# Patient Record
Sex: FEMALE | Race: WHITE | NOT HISPANIC OR LATINO | URBAN - METROPOLITAN AREA
[De-identification: names, ages, dates, MRNs, and addresses within clinical notes are randomized per-mention and may not be internally consistent; named-entity substitution may affect disease eponyms.]

---

## 2020-07-25 ENCOUNTER — TELEPHONE ENCOUNTER (OUTPATIENT)
Dept: URBAN - METROPOLITAN AREA CLINIC 13 | Facility: CLINIC | Age: 55
End: 2020-07-25

## 2020-07-25 RX ORDER — POLYETHYLENE GLYCOL 3350, SODIUM CHLORIDE, SODIUM BICARBONATE AND POTASSIUM CHLORIDE WITH LEMON FLAVOR 420; 11.2; 5.72; 1.48 G/4L; G/4L; G/4L; G/4L
TAKE 1/2 GALLON AT 5:00 PM DAY BEFORE PROCEDURE, TAKE SECOND 1/2 OF GALLON 6 HRS PRIOR TO PROCEDURE POWDER, FOR SOLUTION ORAL
Qty: 1 | Refills: 0 | OUTPATIENT
Start: 2018-04-06 | End: 2018-06-19

## 2020-07-26 ENCOUNTER — TELEPHONE ENCOUNTER (OUTPATIENT)
Dept: URBAN - METROPOLITAN AREA CLINIC 13 | Facility: CLINIC | Age: 55
End: 2020-07-26

## 2020-07-26 RX ORDER — TAPENTADOL HYDROCHLORIDE 150 MG/1
TAKE 1 TABLET TWICE DAILY TABLET, FILM COATED, EXTENDED RELEASE ORAL
Refills: 0 | Status: ACTIVE | COMMUNITY

## 2020-07-26 RX ORDER — DULOXETINE 60 MG/1
TAKE 1 CAPSULE TWICE DAILY CAPSULE, DELAYED RELEASE PELLETS ORAL
Refills: 0 | Status: ACTIVE | COMMUNITY
Start: 2018-04-06

## 2020-07-26 RX ORDER — BREXPIPRAZOLE 1 MG/1
TAKE 1 TABLET DAILY TABLET ORAL
Refills: 0 | Status: ACTIVE | COMMUNITY

## 2020-07-26 RX ORDER — BISACODYL 5 MG/1
TAKE 1 TABLET DAILY TABLET, COATED ORAL
Refills: 0 | Status: ACTIVE | COMMUNITY

## 2020-07-26 RX ORDER — TRAZODONE HYDROCHLORIDE 50 MG/1
TAKE 2 TABLET BEDTIME TABLET ORAL
Refills: 0 | Status: ACTIVE | COMMUNITY
Start: 2018-04-06

## 2020-07-26 RX ORDER — OXYCODONE AND ACETAMINOPHEN 10; 325 MG/1; MG/1
TAKE 1 TABLET 4 TIMES DAILY TABLET ORAL
Refills: 0 | Status: ACTIVE | COMMUNITY
Start: 2018-04-06

## 2020-07-26 RX ORDER — LISINOPRIL AND HYDROCHLOROTHIAZIDE TABLETS 20; 25 MG/1; MG/1
TAKE 1 TABLET ONCE DAILY TABLET ORAL
Refills: 0 | Status: ACTIVE | COMMUNITY
Start: 2018-04-06

## 2020-07-26 RX ORDER — IBUPROFEN AND FAMOTIDINE 800; 26.6 MG/1; MG/1
TAKE 1 TABLET DAILY PRN TABLET, COATED ORAL
Refills: 0 | Status: ACTIVE | COMMUNITY
Start: 2018-04-06

## 2020-07-26 RX ORDER — OMEPRAZOLE 40 MG/1
TAKE 1 TABLET DAILY CAPSULE, DELAYED RELEASE ORAL
Refills: 11 | Status: ACTIVE | COMMUNITY
Start: 2018-04-06

## 2023-09-26 ENCOUNTER — OFFICE VISIT (OUTPATIENT)
Dept: URBAN - METROPOLITAN AREA CLINIC 107 | Facility: CLINIC | Age: 58
End: 2023-09-26
Payer: MEDICARE

## 2023-09-26 ENCOUNTER — LAB OUTSIDE AN ENCOUNTER (OUTPATIENT)
Dept: URBAN - METROPOLITAN AREA CLINIC 107 | Facility: CLINIC | Age: 58
End: 2023-09-26

## 2023-09-26 VITALS
RESPIRATION RATE: 18 BRPM | SYSTOLIC BLOOD PRESSURE: 114 MMHG | WEIGHT: 152 LBS | DIASTOLIC BLOOD PRESSURE: 82 MMHG | HEIGHT: 64 IN | TEMPERATURE: 97.3 F | HEART RATE: 90 BPM | BODY MASS INDEX: 25.95 KG/M2

## 2023-09-26 DIAGNOSIS — R14.0 ABDOMINAL BLOATING: ICD-10-CM

## 2023-09-26 DIAGNOSIS — R19.7 DIARRHEA, UNSPECIFIED TYPE: ICD-10-CM

## 2023-09-26 DIAGNOSIS — R68.81 EARLY SATIETY: ICD-10-CM

## 2023-09-26 DIAGNOSIS — K21.9 GASTROESOPHAGEAL REFLUX DISEASE WITHOUT ESOPHAGITIS: ICD-10-CM

## 2023-09-26 DIAGNOSIS — K59.09 CHRONIC CONSTIPATION: ICD-10-CM

## 2023-09-26 DIAGNOSIS — K57.30 COLON, DIVERTICULOSIS: ICD-10-CM

## 2023-09-26 PROBLEM — 266435005: Status: ACTIVE | Noted: 2023-09-26

## 2023-09-26 PROBLEM — 733657002: Status: ACTIVE | Noted: 2023-09-26

## 2023-09-26 PROCEDURE — 99204 OFFICE O/P NEW MOD 45 MIN: CPT | Performed by: NURSE PRACTITIONER

## 2023-09-26 RX ORDER — OXYCODONE AND ACETAMINOPHEN 10; 325 MG/1; MG/1
TAKE 1 TABLET 4 TIMES DAILY TABLET ORAL
Refills: 0 | Status: ON HOLD | COMMUNITY
Start: 2018-04-06

## 2023-09-26 RX ORDER — BREXPIPRAZOLE 1 MG/1
TAKE 1 TABLET DAILY TABLET ORAL
Refills: 0 | Status: ON HOLD | COMMUNITY

## 2023-09-26 RX ORDER — VENLAFAXINE HYDROCHLORIDE 75 MG/1
1 CAPSULE WITH FOOD CAPSULE, EXTENDED RELEASE ORAL ONCE A DAY
Status: ACTIVE | COMMUNITY

## 2023-09-26 RX ORDER — DULOXETINE 60 MG/1
TAKE 1 CAPSULE TWICE DAILY CAPSULE, DELAYED RELEASE PELLETS ORAL
Refills: 0 | Status: ON HOLD | COMMUNITY
Start: 2018-04-06

## 2023-09-26 RX ORDER — TAPENTADOL HYDROCHLORIDE 150 MG/1
TAKE 1 TABLET TWICE DAILY TABLET, FILM COATED, EXTENDED RELEASE ORAL
Refills: 0 | Status: ON HOLD | COMMUNITY

## 2023-09-26 RX ORDER — IBUPROFEN AND FAMOTIDINE 800; 26.6 MG/1; MG/1
TAKE 1 TABLET DAILY PRN TABLET, COATED ORAL
Refills: 0 | Status: ON HOLD | COMMUNITY
Start: 2018-04-06

## 2023-09-26 RX ORDER — GABAPENTIN 300 MG/1
1 CAPSULE CAPSULE ORAL ONCE A DAY
Status: ACTIVE | COMMUNITY

## 2023-09-26 RX ORDER — LETROZOLE 2.5 MG/1
1 TABLET TABLET, FILM COATED ORAL ONCE A DAY
Status: ACTIVE | COMMUNITY

## 2023-09-26 RX ORDER — BENAZEPRIL HYDROCHLORIDE AND HYDROCHLOROTHIAZIDE 10; 12.5 MG/1; MG/1
1 TABLET TABLET, FILM COATED ORAL ONCE A DAY
Status: ACTIVE | COMMUNITY

## 2023-09-26 RX ORDER — TRAZODONE HYDROCHLORIDE 50 MG/1
TAKE 2 TABLET BEDTIME TABLET ORAL
Refills: 0 | Status: ON HOLD | COMMUNITY
Start: 2018-04-06

## 2023-09-26 RX ORDER — OXYCODONE HYDROCHLORIDE AND ACETAMINOPHEN 10; 325 MG/1; MG/1
1 TABLET AS NEEDED TABLET ORAL
Status: ACTIVE | COMMUNITY

## 2023-09-26 RX ORDER — DEXTROAMPHETAMINE SACCHARATE, AMPHETAMINE ASPARTATE, DEXTROAMPHETAMINE SULFATE, AND AMPHETAMINE SULFATE 7.5; 7.5; 7.5; 7.5 MG/1; MG/1; MG/1; MG/1
1 TABLET TABLET ORAL TWICE A DAY
Status: ACTIVE | COMMUNITY

## 2023-09-26 RX ORDER — OMEPRAZOLE 40 MG/1
TAKE 1 TABLET DAILY CAPSULE, DELAYED RELEASE ORAL
Refills: 11 | Status: ACTIVE | COMMUNITY
Start: 2018-04-06

## 2023-09-26 RX ORDER — LISINOPRIL AND HYDROCHLOROTHIAZIDE TABLETS 20; 25 MG/1; MG/1
TAKE 1 TABLET ONCE DAILY TABLET ORAL
Refills: 0 | Status: ON HOLD | COMMUNITY
Start: 2018-04-06

## 2023-09-26 RX ORDER — BISACODYL 5 MG/1
TAKE 1 TABLET DAILY TABLET, COATED ORAL
Refills: 0 | Status: ON HOLD | COMMUNITY

## 2023-09-26 NOTE — HPI-TODAY'S VISIT:
She reports onset of liquid diarrhea 4 months ago.  She was having as many as 8 bowel movements per day with occasional nocturnal stools.  She reports 3 episodes of nocturnal incontinence.  She was taking Imodium.  After second dose, diarrhea would resolve for a few days and then recur.  In the last week, she has not required Imodium.  She is having a loose bowel movement followed by a formed stool every day.  On one occasion, she saw something in her stool that may have been a clot or mucus or normal.  She took a photo.  Of note, it appears there is mucus on soft, formed, brown stool on the photo.  She denies abdominal pain or cramping associated with diarrhea.  She reported periods of time during which she felt dehydrated.  She drank electrolyte replacement beverages which were helpful.  She denies associated fever.  She denies antecedent antibiotic use.  She drinks milk twice a week.  Ice cream and a milkshake is infrequent.  She eats hard cheeses. At baseline, she has constipation.  She has not had a bowel movement in 2 or 3 days, she takes Dulcolax.  She requires this once or twice a month. She had a hysterectomy last year.  Since then, she has experienced bloating over the level of the umbilicus toward the epigastrium.  She reports that it "feels like it may burst."  She reports passing flatus and belching.  She reports worsening abdominal noise.  She has a history of acid reflux.  She has been on omeprazole 40 mg daily for years.  She has occasional heartburn associated with bloating for which she takes Tums.  In the last 4 months, she is also experienced fullness early in meals.  She describes early satiety.  She has infrequent dysphagia describing oropharyngeal dysphagia stating that she occasionally has to forcefully swallow to move material through her throat.  Patient 
This is a 58-year-old female with a history of hyperlipidemia, hypertension, anxiety, GERD presenting for evaluation of diarrhea. She was last seen 6/19/2018 for a screening colonoscopy notable for BBPS 8, sigmoid diverticulosis, normal terminal ileum, no specimens collected.  Colonoscopy for screening recommended 2028.
12-Sep-2022 16:35

## 2023-09-28 ENCOUNTER — TELEPHONE ENCOUNTER (OUTPATIENT)
Dept: URBAN - METROPOLITAN AREA CLINIC 113 | Facility: CLINIC | Age: 58
End: 2023-09-28

## 2023-09-28 ENCOUNTER — OFFICE VISIT (OUTPATIENT)
Dept: URBAN - METROPOLITAN AREA SURGERY CENTER 25 | Facility: SURGERY CENTER | Age: 58
End: 2023-09-28
Payer: MEDICARE

## 2023-09-28 ENCOUNTER — WEB ENCOUNTER (OUTPATIENT)
Dept: URBAN - METROPOLITAN AREA SURGERY CENTER 25 | Facility: SURGERY CENTER | Age: 58
End: 2023-09-28

## 2023-09-28 ENCOUNTER — LAB OUTSIDE AN ENCOUNTER (OUTPATIENT)
Dept: URBAN - METROPOLITAN AREA CLINIC 113 | Facility: CLINIC | Age: 58
End: 2023-09-28

## 2023-09-28 ENCOUNTER — CLAIMS CREATED FROM THE CLAIM WINDOW (OUTPATIENT)
Dept: URBAN - METROPOLITAN AREA CLINIC 4 | Facility: CLINIC | Age: 58
End: 2023-09-28
Payer: MEDICARE

## 2023-09-28 DIAGNOSIS — K29.50 UNSPECIFIED CHRONIC GASTRITIS WITHOUT BLEEDING: ICD-10-CM

## 2023-09-28 DIAGNOSIS — K21.9 GASTRO-ESOPHAGEAL REFLUX DISEASE WITHOUT ESOPHAGITIS: ICD-10-CM

## 2023-09-28 DIAGNOSIS — K21.9 GASTROESOPHAGEAL REFLUX DISEASE WITHOUT ESOPHAGITIS: ICD-10-CM

## 2023-09-28 DIAGNOSIS — K31.7 POLYP OF STOMACH AND DUODENUM: ICD-10-CM

## 2023-09-28 DIAGNOSIS — K29.70 GASTRITIS, UNSPECIFIED, WITHOUT BLEEDING: ICD-10-CM

## 2023-09-28 DIAGNOSIS — R68.81 EARLY SATIETY: ICD-10-CM

## 2023-09-28 DIAGNOSIS — K31.7 BENIGN GASTRIC POLYP: ICD-10-CM

## 2023-09-28 DIAGNOSIS — K31.89 REACTIVE GASTROPATHY: ICD-10-CM

## 2023-09-28 PROCEDURE — 00731 ANES UPR GI NDSC PX NOS: CPT | Performed by: ANESTHESIOLOGY

## 2023-09-28 PROCEDURE — G8907 PT DOC NO EVENTS ON DISCHARG: HCPCS | Performed by: INTERNAL MEDICINE

## 2023-09-28 PROCEDURE — 88305 TISSUE EXAM BY PATHOLOGIST: CPT | Performed by: PATHOLOGY

## 2023-09-28 PROCEDURE — 00731 ANES UPR GI NDSC PX NOS: CPT | Performed by: NURSE ANESTHETIST, CERTIFIED REGISTERED

## 2023-09-28 PROCEDURE — 43239 EGD BIOPSY SINGLE/MULTIPLE: CPT | Performed by: INTERNAL MEDICINE

## 2023-09-28 PROCEDURE — 88312 SPECIAL STAINS GROUP 1: CPT | Performed by: PATHOLOGY

## 2023-09-28 RX ORDER — LISINOPRIL AND HYDROCHLOROTHIAZIDE TABLETS 20; 25 MG/1; MG/1
TAKE 1 TABLET ONCE DAILY TABLET ORAL
Refills: 0 | Status: ON HOLD | COMMUNITY
Start: 2018-04-06

## 2023-09-28 RX ORDER — VENLAFAXINE HYDROCHLORIDE 75 MG/1
1 CAPSULE WITH FOOD CAPSULE, EXTENDED RELEASE ORAL ONCE A DAY
Status: ACTIVE | COMMUNITY

## 2023-09-28 RX ORDER — OXYCODONE HYDROCHLORIDE AND ACETAMINOPHEN 10; 325 MG/1; MG/1
1 TABLET AS NEEDED TABLET ORAL
Status: ACTIVE | COMMUNITY

## 2023-09-28 RX ORDER — DEXTROAMPHETAMINE SACCHARATE, AMPHETAMINE ASPARTATE, DEXTROAMPHETAMINE SULFATE, AND AMPHETAMINE SULFATE 7.5; 7.5; 7.5; 7.5 MG/1; MG/1; MG/1; MG/1
1 TABLET TABLET ORAL TWICE A DAY
Status: ACTIVE | COMMUNITY

## 2023-09-28 RX ORDER — OXYCODONE AND ACETAMINOPHEN 10; 325 MG/1; MG/1
TAKE 1 TABLET 4 TIMES DAILY TABLET ORAL
Refills: 0 | Status: ON HOLD | COMMUNITY
Start: 2018-04-06

## 2023-09-28 RX ORDER — BREXPIPRAZOLE 1 MG/1
TAKE 1 TABLET DAILY TABLET ORAL
Refills: 0 | Status: ON HOLD | COMMUNITY

## 2023-09-28 RX ORDER — BISACODYL 5 MG/1
TAKE 1 TABLET DAILY TABLET, COATED ORAL
Refills: 0 | Status: ON HOLD | COMMUNITY

## 2023-09-28 RX ORDER — GABAPENTIN 300 MG/1
1 CAPSULE CAPSULE ORAL ONCE A DAY
Status: ACTIVE | COMMUNITY

## 2023-09-28 RX ORDER — TRAZODONE HYDROCHLORIDE 50 MG/1
TAKE 2 TABLET BEDTIME TABLET ORAL
Refills: 0 | Status: ON HOLD | COMMUNITY
Start: 2018-04-06

## 2023-09-28 RX ORDER — DULOXETINE 60 MG/1
TAKE 1 CAPSULE TWICE DAILY CAPSULE, DELAYED RELEASE PELLETS ORAL
Refills: 0 | Status: ON HOLD | COMMUNITY
Start: 2018-04-06

## 2023-09-28 RX ORDER — BENAZEPRIL HYDROCHLORIDE AND HYDROCHLOROTHIAZIDE 10; 12.5 MG/1; MG/1
1 TABLET TABLET, FILM COATED ORAL ONCE A DAY
Status: ACTIVE | COMMUNITY

## 2023-09-28 RX ORDER — OMEPRAZOLE 40 MG/1
TAKE 1 TABLET DAILY CAPSULE, DELAYED RELEASE ORAL
Refills: 11 | Status: ACTIVE | COMMUNITY
Start: 2018-04-06

## 2023-09-28 RX ORDER — LETROZOLE 2.5 MG/1
1 TABLET TABLET, FILM COATED ORAL ONCE A DAY
Status: ACTIVE | COMMUNITY

## 2023-09-28 RX ORDER — IBUPROFEN AND FAMOTIDINE 800; 26.6 MG/1; MG/1
TAKE 1 TABLET DAILY PRN TABLET, COATED ORAL
Refills: 0 | Status: ON HOLD | COMMUNITY
Start: 2018-04-06

## 2023-09-28 RX ORDER — TAPENTADOL HYDROCHLORIDE 150 MG/1
TAKE 1 TABLET TWICE DAILY TABLET, FILM COATED, EXTENDED RELEASE ORAL
Refills: 0 | Status: ON HOLD | COMMUNITY

## 2023-12-04 ENCOUNTER — OUT OF OFFICE VISIT (OUTPATIENT)
Dept: URBAN - METROPOLITAN AREA SURGERY CENTER 25 | Facility: SURGERY CENTER | Age: 58
End: 2023-12-04
Payer: MEDICARE

## 2023-12-04 ENCOUNTER — CLAIMS CREATED FROM THE CLAIM WINDOW (OUTPATIENT)
Dept: URBAN - METROPOLITAN AREA CLINIC 4 | Facility: CLINIC | Age: 58
End: 2023-12-04
Payer: MEDICARE

## 2023-12-04 DIAGNOSIS — K63.89 OTHER SPECIFIED DISEASES OF INTESTINE: ICD-10-CM

## 2023-12-04 DIAGNOSIS — K62.1 ANAL AND RECTAL POLYP: ICD-10-CM

## 2023-12-04 DIAGNOSIS — K64.0 FIRST DEGREE HEMORRHOIDS: ICD-10-CM

## 2023-12-04 DIAGNOSIS — R19.4 CHANGE IN BOWEL HABITS: ICD-10-CM

## 2023-12-04 DIAGNOSIS — R19.7 DIARRHEA, UNSPECIFIED TYPE: ICD-10-CM

## 2023-12-04 DIAGNOSIS — D12.8 ADENOMATOUS POLYP OF RECTUM: ICD-10-CM

## 2023-12-04 DIAGNOSIS — R19.7 DIARRHEA: ICD-10-CM

## 2023-12-04 DIAGNOSIS — K57.30 COLON, DIVERTICULOSIS: ICD-10-CM

## 2023-12-04 PROCEDURE — 00811 ANES LWR INTST NDSC NOS: CPT | Performed by: ANESTHESIOLOGIST ASSISTANT

## 2023-12-04 PROCEDURE — 00811 ANES LWR INTST NDSC NOS: CPT | Performed by: ANESTHESIOLOGY

## 2023-12-04 PROCEDURE — 45380 COLONOSCOPY AND BIOPSY: CPT | Performed by: INTERNAL MEDICINE

## 2023-12-04 PROCEDURE — 88313 SPECIAL STAINS GROUP 2: CPT | Performed by: PATHOLOGY

## 2023-12-04 PROCEDURE — G8907 PT DOC NO EVENTS ON DISCHARG: HCPCS | Performed by: INTERNAL MEDICINE

## 2023-12-04 PROCEDURE — 88305 TISSUE EXAM BY PATHOLOGIST: CPT | Performed by: PATHOLOGY

## 2023-12-04 PROCEDURE — 88342 IMHCHEM/IMCYTCHM 1ST ANTB: CPT | Performed by: PATHOLOGY

## 2023-12-04 RX ORDER — BENAZEPRIL HYDROCHLORIDE AND HYDROCHLOROTHIAZIDE 10; 12.5 MG/1; MG/1
1 TABLET TABLET, FILM COATED ORAL ONCE A DAY
Status: ACTIVE | COMMUNITY

## 2023-12-04 RX ORDER — OXYCODONE HYDROCHLORIDE AND ACETAMINOPHEN 10; 325 MG/1; MG/1
1 TABLET AS NEEDED TABLET ORAL
Status: ACTIVE | COMMUNITY

## 2023-12-04 RX ORDER — LISINOPRIL AND HYDROCHLOROTHIAZIDE TABLETS 20; 25 MG/1; MG/1
TAKE 1 TABLET ONCE DAILY TABLET ORAL
Refills: 0 | Status: ON HOLD | COMMUNITY
Start: 2018-04-06

## 2023-12-04 RX ORDER — OXYCODONE AND ACETAMINOPHEN 10; 325 MG/1; MG/1
TAKE 1 TABLET 4 TIMES DAILY TABLET ORAL
Refills: 0 | Status: ON HOLD | COMMUNITY
Start: 2018-04-06

## 2023-12-04 RX ORDER — DULOXETINE 60 MG/1
TAKE 1 CAPSULE TWICE DAILY CAPSULE, DELAYED RELEASE PELLETS ORAL
Refills: 0 | Status: ON HOLD | COMMUNITY
Start: 2018-04-06

## 2023-12-04 RX ORDER — TAPENTADOL HYDROCHLORIDE 150 MG/1
TAKE 1 TABLET TWICE DAILY TABLET, FILM COATED, EXTENDED RELEASE ORAL
Refills: 0 | Status: ON HOLD | COMMUNITY

## 2023-12-04 RX ORDER — IBUPROFEN AND FAMOTIDINE 800; 26.6 MG/1; MG/1
TAKE 1 TABLET DAILY PRN TABLET, COATED ORAL
Refills: 0 | Status: ON HOLD | COMMUNITY
Start: 2018-04-06

## 2023-12-04 RX ORDER — BREXPIPRAZOLE 1 MG/1
TAKE 1 TABLET DAILY TABLET ORAL
Refills: 0 | Status: ON HOLD | COMMUNITY

## 2023-12-04 RX ORDER — BISACODYL 5 MG/1
TAKE 1 TABLET DAILY TABLET, COATED ORAL
Refills: 0 | Status: ON HOLD | COMMUNITY

## 2023-12-04 RX ORDER — GABAPENTIN 300 MG/1
1 CAPSULE CAPSULE ORAL ONCE A DAY
Status: ACTIVE | COMMUNITY

## 2023-12-04 RX ORDER — DEXTROAMPHETAMINE SACCHARATE, AMPHETAMINE ASPARTATE, DEXTROAMPHETAMINE SULFATE, AND AMPHETAMINE SULFATE 7.5; 7.5; 7.5; 7.5 MG/1; MG/1; MG/1; MG/1
1 TABLET TABLET ORAL TWICE A DAY
Status: ACTIVE | COMMUNITY

## 2023-12-04 RX ORDER — LETROZOLE 2.5 MG/1
1 TABLET TABLET, FILM COATED ORAL ONCE A DAY
Status: ACTIVE | COMMUNITY

## 2023-12-04 RX ORDER — OMEPRAZOLE 40 MG/1
TAKE 1 TABLET DAILY CAPSULE, DELAYED RELEASE ORAL
Refills: 11 | Status: ACTIVE | COMMUNITY
Start: 2018-04-06

## 2023-12-04 RX ORDER — VENLAFAXINE HYDROCHLORIDE 75 MG/1
1 CAPSULE WITH FOOD CAPSULE, EXTENDED RELEASE ORAL ONCE A DAY
Status: ACTIVE | COMMUNITY

## 2023-12-04 RX ORDER — TRAZODONE HYDROCHLORIDE 50 MG/1
TAKE 2 TABLET BEDTIME TABLET ORAL
Refills: 0 | Status: ON HOLD | COMMUNITY
Start: 2018-04-06

## 2023-12-27 ENCOUNTER — DASHBOARD ENCOUNTERS (OUTPATIENT)
Age: 58
End: 2023-12-27

## 2023-12-27 ENCOUNTER — OFFICE VISIT (OUTPATIENT)
Dept: URBAN - METROPOLITAN AREA CLINIC 107 | Facility: CLINIC | Age: 58
End: 2023-12-27
Payer: MEDICARE

## 2023-12-27 VITALS
HEART RATE: 113 BPM | SYSTOLIC BLOOD PRESSURE: 121 MMHG | WEIGHT: 153 LBS | TEMPERATURE: 97.6 F | DIASTOLIC BLOOD PRESSURE: 93 MMHG | HEIGHT: 64 IN | BODY MASS INDEX: 26.12 KG/M2

## 2023-12-27 DIAGNOSIS — R68.81 EARLY SATIETY: ICD-10-CM

## 2023-12-27 DIAGNOSIS — K21.9 GASTROESOPHAGEAL REFLUX DISEASE WITHOUT ESOPHAGITIS: ICD-10-CM

## 2023-12-27 DIAGNOSIS — R14.0 ABDOMINAL BLOATING: ICD-10-CM

## 2023-12-27 DIAGNOSIS — R19.7 DIARRHEA, UNSPECIFIED TYPE: ICD-10-CM

## 2023-12-27 DIAGNOSIS — K57.30 COLON, DIVERTICULOSIS: ICD-10-CM

## 2023-12-27 DIAGNOSIS — K59.09 CHRONIC CONSTIPATION: ICD-10-CM

## 2023-12-27 PROCEDURE — 99213 OFFICE O/P EST LOW 20 MIN: CPT | Performed by: NURSE PRACTITIONER

## 2023-12-27 RX ORDER — IBUPROFEN AND FAMOTIDINE 800; 26.6 MG/1; MG/1
TAKE 1 TABLET DAILY PRN TABLET, COATED ORAL
Refills: 0 | Status: ON HOLD | COMMUNITY
Start: 2018-04-06

## 2023-12-27 RX ORDER — DEXTROAMPHETAMINE SACCHARATE, AMPHETAMINE ASPARTATE, DEXTROAMPHETAMINE SULFATE, AND AMPHETAMINE SULFATE 7.5; 7.5; 7.5; 7.5 MG/1; MG/1; MG/1; MG/1
1 TABLET TABLET ORAL TWICE A DAY
Status: ACTIVE | COMMUNITY

## 2023-12-27 RX ORDER — BENAZEPRIL HYDROCHLORIDE AND HYDROCHLOROTHIAZIDE 10; 12.5 MG/1; MG/1
1 TABLET TABLET, FILM COATED ORAL ONCE A DAY
Status: ACTIVE | COMMUNITY

## 2023-12-27 RX ORDER — DULOXETINE 60 MG/1
TAKE 1 CAPSULE TWICE DAILY CAPSULE, DELAYED RELEASE PELLETS ORAL
Refills: 0 | Status: ON HOLD | COMMUNITY
Start: 2018-04-06

## 2023-12-27 RX ORDER — GABAPENTIN 300 MG/1
1 CAPSULE CAPSULE ORAL ONCE A DAY
Status: ACTIVE | COMMUNITY

## 2023-12-27 RX ORDER — OXYCODONE AND ACETAMINOPHEN 10; 325 MG/1; MG/1
TAKE 1 TABLET 4 TIMES DAILY TABLET ORAL
Refills: 0 | Status: ON HOLD | COMMUNITY
Start: 2018-04-06

## 2023-12-27 RX ORDER — OMEPRAZOLE 40 MG/1
TAKE 1 TABLET DAILY CAPSULE, DELAYED RELEASE ORAL
Refills: 11 | Status: ACTIVE | COMMUNITY
Start: 2018-04-06

## 2023-12-27 RX ORDER — TRAZODONE HYDROCHLORIDE 50 MG/1
TAKE 2 TABLET BEDTIME TABLET ORAL
Refills: 0 | Status: ON HOLD | COMMUNITY
Start: 2018-04-06

## 2023-12-27 RX ORDER — BREXPIPRAZOLE 1 MG/1
TAKE 1 TABLET DAILY TABLET ORAL
Refills: 0 | Status: ON HOLD | COMMUNITY

## 2023-12-27 RX ORDER — LISINOPRIL AND HYDROCHLOROTHIAZIDE TABLETS 20; 25 MG/1; MG/1
TAKE 1 TABLET ONCE DAILY TABLET ORAL
Refills: 0 | Status: ON HOLD | COMMUNITY
Start: 2018-04-06

## 2023-12-27 RX ORDER — VENLAFAXINE HYDROCHLORIDE 75 MG/1
1 CAPSULE WITH FOOD CAPSULE, EXTENDED RELEASE ORAL ONCE A DAY
Status: ACTIVE | COMMUNITY

## 2023-12-27 RX ORDER — TAPENTADOL HYDROCHLORIDE 150 MG/1
TAKE 1 TABLET TWICE DAILY TABLET, FILM COATED, EXTENDED RELEASE ORAL
Refills: 0 | Status: ON HOLD | COMMUNITY

## 2023-12-27 RX ORDER — BISACODYL 5 MG/1
TAKE 1 TABLET DAILY TABLET, COATED ORAL
Refills: 0 | Status: ON HOLD | COMMUNITY

## 2023-12-27 RX ORDER — LETROZOLE 2.5 MG/1
1 TABLET TABLET, FILM COATED ORAL ONCE A DAY
Status: ACTIVE | COMMUNITY

## 2023-12-27 RX ORDER — OXYCODONE HYDROCHLORIDE AND ACETAMINOPHEN 10; 325 MG/1; MG/1
1 TABLET AS NEEDED TABLET ORAL
Status: ACTIVE | COMMUNITY

## 2023-12-27 NOTE — HPI-TODAY'S VISIT:
This is a 58-year-old female with a history of hyperlipidemia, hypertension, anxiety, GERD, early satiety, abdominal bloating, chronic constipation, colon diverticulosis, and diarrhea predominant change in bowel habits presenting for follow-up.She was last seen in the office 9/26/2023 after a long interval.  She reported a 4-month history of multiple liquid stools per day with infrequent nocturnal fecal incontinence.  After a second dose of Imodium, her symptoms would resolve for a few days and then recur.  Diarrhea resolved a week prior to her visit.  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  Daily fiber was recommended.  Stool studies were a consideration as well as colonoscopy to assess for evidence of inflammation of diarrhea recurred.  She had constipation at baseline.  It was discussed that opioid use was likely contributing.  She was to take fiber and start MiraLAX 1 capful daily if needed.  She complained of abdominal bloating and excessive gas.  This was chronic and determined likely to be associated with diet.  She was to begin a diet low in fermentable sugars.  She had acid reflux and had been PPI dependent for many years.  She reported early satiety that started 4 months prior to her visit.  She was to continue omeprazole 40 mg daily.  EGD was recommended.  At the time of her EGD, due to a change in bowel habits, she was scheduled for colonoscopy.  Colonoscopy 12/4/2023: BBPS 9 (MiraLAX), sigmoid/descending/transverse diverticulosis, removal of a 4 mm rectal sessile polyp, the entire examined colon appeared normal status post random biopsy, mild grade 1 nonbleeding internal hemorrhoids.  Pathology: Random biopsies demonstrated no significant abnormality.  Rectal polyp was hyperplastic.  Colonoscopy for screening recommended in 2033.  EGD 9/28/2023: Mucosal changes in the proximal esophagus consistent with an inlet patch, chronic gastritis status postbiopsy, multiple gastric polyps status postbiopsy, normal examined duodenum.  Pathology: Antral biopsies demonstrated chemical/reactive gastropathy without evidence of H. pylori or intestinal metaplasia.  Stomach body biopsy demonstrated fundic gland polyp without evidence of H. pylori or intestinal metaplasia.   She is taking omeprazole 40 mg daily.  She has made dietary modification eating smaller, more frequent meals and "grazing."  She is also taking Pepto-Bismol before meals.  She reports less early satiety and bloating.  She is taking Benefiber 1 tablespoon daily.  She is having regular bowel movements and denies diarrhea or constipation.  She admits some bloating but this has likewise improved.  She denies excessive gas.

## 2023-12-27 NOTE — HPI-OTHER HISTORIES
Colonoscopy 12/4/2023: BBPS 9 (MiraLAX), sigmoid/descending/transverse diverticulosis, removal of a 4 mm rectal sessile polyp, the entire examined colon appeared normal status post random biopsy, mild grade 1 nonbleeding internal hemorrhoids. Pathology: Random biopsies demonstrated no significant abnormality. Rectal polyp was hyperplastic. Colonoscopy for screening recommended in 2033. recall set  EGD 9/28/2023: Mucosal changes in the proximal esophagus consistent with an inlet patch, chronic gastritis status postbiopsy, multiple gastric polyps status postbiopsy, normal examined duodenum. Pathology: Antral biopsies demonstrated chemical/reactive gastropathy without evidence of H. pylori or intestinal metaplasia. Stomach body biopsy demonstrated fundic gland polyp without evidence of H. pylori or intestinal metaplasia.